# Patient Record
Sex: FEMALE | Race: WHITE
[De-identification: names, ages, dates, MRNs, and addresses within clinical notes are randomized per-mention and may not be internally consistent; named-entity substitution may affect disease eponyms.]

---

## 2018-03-17 ENCOUNTER — HOSPITAL ENCOUNTER (EMERGENCY)
Dept: HOSPITAL 82 - ED | Age: 58
Discharge: HOME | DRG: 914 | End: 2018-03-17
Payer: COMMERCIAL

## 2018-03-17 VITALS — DIASTOLIC BLOOD PRESSURE: 78 MMHG | SYSTOLIC BLOOD PRESSURE: 140 MMHG
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
## 2018-03-17 DIAGNOSIS — S09.11XA: Primary | ICD-10-CM
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
## 2020-11-30 ENCOUNTER — HOSPITAL ENCOUNTER (EMERGENCY)
Dept: HOSPITAL 82 - ED | Age: 60
Discharge: ANOTHER HOSPITAL | DRG: 295 | End: 2020-11-30
Payer: SELF-PAY

## 2020-11-30 VITALS — WEIGHT: 149.91 LBS | HEIGHT: 64 IN | BODY MASS INDEX: 25.59 KG/M2

## 2020-11-30 VITALS — SYSTOLIC BLOOD PRESSURE: 114 MMHG | DIASTOLIC BLOOD PRESSURE: 745 MMHG

## 2020-11-30 DIAGNOSIS — Z20.828: ICD-10-CM

## 2020-11-30 DIAGNOSIS — F17.200: ICD-10-CM

## 2020-11-30 DIAGNOSIS — N28.89: ICD-10-CM

## 2020-11-30 DIAGNOSIS — I82.220: Primary | ICD-10-CM

## 2020-11-30 DIAGNOSIS — D72.829: ICD-10-CM

## 2020-11-30 DIAGNOSIS — R91.8: ICD-10-CM

## 2020-11-30 LAB
ALBUMIN SERPL-MCNC: 3.6 G/DL (ref 3.2–5)
ALP SERPL-CCNC: 235 U/L (ref 38–126)
AMYLASE SERPL-CCNC: 57 U/L (ref 30–110)
ANION GAP SERPL CALCULATED.3IONS-SCNC: 13 MMOL/L
AST SERPL-CCNC: 27 U/L (ref 9–36)
BACTERIA #/AREA URNS HPF: (no result) HPF
BASOPHILS NFR BLD AUTO: 1 % (ref 0–3)
BILIRUB UR QL STRIP.AUTO: NEGATIVE
BUN SERPL-MCNC: 13 MG/DL (ref 8–23)
BUN/CREAT SERPL: 13
CHLORIDE SERPL-SCNC: 100 MMOL/L (ref 95–108)
CO2 SERPL-SCNC: 28 MMOL/L (ref 22–30)
COLOR UR AUTO: YELLOW
CREAT SERPL-MCNC: 1 MG/DL (ref 0.5–1)
EOSINOPHIL NFR BLD AUTO: 1 % (ref 0–8)
EPI CELLS URNS QL MICRO: (no result) EPI/HPF
ERYTHROCYTE [DISTWIDTH] IN BLOOD BY AUTOMATED COUNT: 14.6 % (ref 11.5–15.5)
GLUCOSE UR STRIP.AUTO-MCNC: NEGATIVE MG/DL
HCT VFR BLD AUTO: 41.2 % (ref 37–47)
HGB BLD-MCNC: 12.6 G/DL (ref 12–16)
HGB UR QL STRIP.AUTO: (no result)
IMM GRANULOCYTES NFR BLD: 0.5 % (ref 0–5)
KETONES UR STRIP.AUTO-MCNC: NEGATIVE MG/DL
LEUKOCYTE ESTERASE UR QL STRIP.AUTO: NEGATIVE
LIPASE SERPL-CCNC: 46 U/L (ref 23–300)
LYMPHOCYTES NFR BLD: 7 % (ref 15–41)
MCH RBC QN AUTO: 27.2 PG  CALC (ref 26–32)
MCHC RBC AUTO-ENTMCNC: 30.6 G/DL CAL (ref 32–36)
MCV RBC AUTO: 88.8 FL  CALC (ref 80–100)
MONOCYTES NFR BLD AUTO: 7 % (ref 2–13)
MYOGLOBIN SERPL-MCNC: 18 NG/ML (ref 0–62)
NEUTROPHILS # BLD AUTO: 18.14 THOU/UL (ref 2–7.15)
NEUTROPHILS NFR BLD AUTO: 85 % (ref 42–76)
NITRITE UR QL STRIP.AUTO: POSITIVE
PH UR STRIP.AUTO: 7 [PH] (ref 4.5–8)
PLATELET # BLD AUTO: 681 THOU/UL (ref 130–400)
POTASSIUM SERPL-SCNC: 4.8 MMOL/L (ref 3.5–5.1)
PROT SERPL-MCNC: 7.2 G/DL (ref 6.3–8.2)
PROT UR QL STRIP.AUTO: NEGATIVE MG/DL
RBC # BLD AUTO: 4.64 MILL/UL (ref 4.2–5.6)
RBC #/AREA URNS HPF: (no result) RBC/HPF (ref 0–5)
SODIUM SERPL-SCNC: 136 MMOL/L (ref 137–146)
SP GR UR STRIP.AUTO: <=1.005
UROBILINOGEN UR QL STRIP.AUTO: 0.2 E.U./DL
WBC #/AREA URNS HPF: (no result) WBC/HPF (ref 0–5)

## 2020-11-30 PROCEDURE — S0164 INJECTION PANTROPRAZOLE: HCPCS

## 2020-12-02 NOTE — NUR
Final urine culture results were sent via fax [phone] to the nurse at [facility]

## 2021-01-30 ENCOUNTER — HOSPITAL ENCOUNTER (INPATIENT)
Dept: HOSPITAL 82 - ED | Age: 61
LOS: 3 days | Discharge: HOME HEALTH | DRG: 91 | End: 2021-02-02
Attending: INTERNAL MEDICINE | Admitting: INTERNAL MEDICINE
Payer: MEDICAID

## 2021-01-30 VITALS — HEIGHT: 62 IN | BODY MASS INDEX: 23.12 KG/M2 | WEIGHT: 125.66 LBS

## 2021-01-30 DIAGNOSIS — G92: Primary | ICD-10-CM

## 2021-01-30 DIAGNOSIS — T40.7X5A: ICD-10-CM

## 2021-01-30 DIAGNOSIS — G93.41: ICD-10-CM

## 2021-01-30 DIAGNOSIS — Z86.718: ICD-10-CM

## 2021-01-30 DIAGNOSIS — I95.9: ICD-10-CM

## 2021-01-30 DIAGNOSIS — E86.0: ICD-10-CM

## 2021-01-30 DIAGNOSIS — C34.01: ICD-10-CM

## 2021-01-30 DIAGNOSIS — Z87.891: ICD-10-CM

## 2021-01-30 DIAGNOSIS — C79.70: ICD-10-CM

## 2021-01-30 DIAGNOSIS — E87.5: ICD-10-CM

## 2021-01-30 DIAGNOSIS — E87.1: ICD-10-CM

## 2021-01-30 DIAGNOSIS — Z79.01: ICD-10-CM

## 2021-01-30 DIAGNOSIS — D70.1: ICD-10-CM

## 2021-01-30 DIAGNOSIS — D64.81: ICD-10-CM

## 2021-01-30 DIAGNOSIS — Z79.899: ICD-10-CM

## 2021-01-30 DIAGNOSIS — T45.1X5A: ICD-10-CM

## 2021-01-30 DIAGNOSIS — T40.2X5A: ICD-10-CM

## 2021-01-30 LAB
ALBUMIN SERPL-MCNC: 3 G/DL (ref 3.2–5)
ALP SERPL-CCNC: 177 U/L (ref 38–126)
ANION GAP SERPL CALCULATED.3IONS-SCNC: 12 MMOL/L
AST SERPL-CCNC: 56 U/L (ref 9–36)
BASOPHILS NFR BLD AUTO: 0 % (ref 0–3)
BUN SERPL-MCNC: 28 MG/DL (ref 8–23)
BUN/CREAT SERPL: 30
CHLORIDE SERPL-SCNC: 100 MMOL/L (ref 95–108)
CO2 SERPL-SCNC: 19 MMOL/L (ref 22–30)
CREAT SERPL-MCNC: 0.9 MG/DL (ref 0.5–1)
EOSINOPHIL NFR BLD AUTO: 0 % (ref 0–8)
ERYTHROCYTE [DISTWIDTH] IN BLOOD BY AUTOMATED COUNT: 16.9 % (ref 11.5–15.5)
ETHANOL SERPL-MCNC: 0 MG/DL (ref 0–30)
HCT VFR BLD AUTO: 31.9 % (ref 37–47)
HGB BLD-MCNC: 9.3 G/DL (ref 12–16)
IMM GRANULOCYTES NFR BLD: 1.1 % (ref 0–5)
INR PPP: 1.2 RATIO (ref 0.7–1.3)
LYMPHOCYTES NFR BLD: 7 % (ref 15–41)
MCH RBC QN AUTO: 27.6 PG  CALC (ref 26–32)
MCHC RBC AUTO-ENTMCNC: 29.2 G/DL CAL (ref 32–36)
MCV RBC AUTO: 94.7 FL  CALC (ref 80–100)
MONOCYTES NFR BLD AUTO: 0 % (ref 2–13)
MYOGLOBIN SERPL-MCNC: 34 NG/ML (ref 0–62)
NEUTROPHILS # BLD AUTO: 12.67 THOU/UL (ref 2–7.15)
NEUTROPHILS NFR BLD AUTO: 91 % (ref 42–76)
PLATELET # BLD AUTO: 469 THOU/UL (ref 130–400)
POTASSIUM SERPL-SCNC: 5.3 MMOL/L (ref 3.5–5.1)
PROT SERPL-MCNC: 5.9 G/DL (ref 6.3–8.2)
PROTHROMBIN TIME: 11.8 SECONDS (ref 9–12.5)
RBC # BLD AUTO: 3.37 MILL/UL (ref 4.2–5.6)
SODIUM SERPL-SCNC: 126 MMOL/L (ref 137–146)

## 2021-01-30 PROCEDURE — P9016 RBC LEUKOCYTES REDUCED: HCPCS

## 2021-01-31 VITALS — SYSTOLIC BLOOD PRESSURE: 95 MMHG | DIASTOLIC BLOOD PRESSURE: 56 MMHG

## 2021-01-31 VITALS — SYSTOLIC BLOOD PRESSURE: 116 MMHG | DIASTOLIC BLOOD PRESSURE: 63 MMHG

## 2021-01-31 VITALS — SYSTOLIC BLOOD PRESSURE: 114 MMHG | DIASTOLIC BLOOD PRESSURE: 60 MMHG

## 2021-01-31 VITALS — DIASTOLIC BLOOD PRESSURE: 51 MMHG | SYSTOLIC BLOOD PRESSURE: 89 MMHG

## 2021-01-31 VITALS — SYSTOLIC BLOOD PRESSURE: 104 MMHG | DIASTOLIC BLOOD PRESSURE: 60 MMHG

## 2021-01-31 VITALS — SYSTOLIC BLOOD PRESSURE: 125 MMHG | DIASTOLIC BLOOD PRESSURE: 69 MMHG

## 2021-01-31 VITALS — DIASTOLIC BLOOD PRESSURE: 87 MMHG | SYSTOLIC BLOOD PRESSURE: 126 MMHG

## 2021-01-31 VITALS — SYSTOLIC BLOOD PRESSURE: 116 MMHG | DIASTOLIC BLOOD PRESSURE: 61 MMHG

## 2021-01-31 VITALS — DIASTOLIC BLOOD PRESSURE: 61 MMHG | SYSTOLIC BLOOD PRESSURE: 115 MMHG

## 2021-01-31 VITALS — DIASTOLIC BLOOD PRESSURE: 65 MMHG | SYSTOLIC BLOOD PRESSURE: 117 MMHG

## 2021-01-31 VITALS — SYSTOLIC BLOOD PRESSURE: 118 MMHG | DIASTOLIC BLOOD PRESSURE: 54 MMHG

## 2021-01-31 VITALS — SYSTOLIC BLOOD PRESSURE: 103 MMHG | DIASTOLIC BLOOD PRESSURE: 56 MMHG

## 2021-01-31 VITALS — SYSTOLIC BLOOD PRESSURE: 106 MMHG | DIASTOLIC BLOOD PRESSURE: 72 MMHG

## 2021-01-31 VITALS — SYSTOLIC BLOOD PRESSURE: 119 MMHG | DIASTOLIC BLOOD PRESSURE: 71 MMHG

## 2021-01-31 VITALS — DIASTOLIC BLOOD PRESSURE: 59 MMHG | SYSTOLIC BLOOD PRESSURE: 122 MMHG

## 2021-01-31 VITALS — DIASTOLIC BLOOD PRESSURE: 62 MMHG | SYSTOLIC BLOOD PRESSURE: 127 MMHG

## 2021-01-31 VITALS — SYSTOLIC BLOOD PRESSURE: 134 MMHG | DIASTOLIC BLOOD PRESSURE: 67 MMHG

## 2021-01-31 VITALS — SYSTOLIC BLOOD PRESSURE: 102 MMHG | DIASTOLIC BLOOD PRESSURE: 52 MMHG

## 2021-01-31 VITALS — SYSTOLIC BLOOD PRESSURE: 95 MMHG | DIASTOLIC BLOOD PRESSURE: 57 MMHG

## 2021-01-31 VITALS — DIASTOLIC BLOOD PRESSURE: 59 MMHG | SYSTOLIC BLOOD PRESSURE: 103 MMHG

## 2021-01-31 VITALS — SYSTOLIC BLOOD PRESSURE: 84 MMHG | DIASTOLIC BLOOD PRESSURE: 48 MMHG

## 2021-01-31 VITALS — DIASTOLIC BLOOD PRESSURE: 71 MMHG | SYSTOLIC BLOOD PRESSURE: 119 MMHG

## 2021-01-31 VITALS — SYSTOLIC BLOOD PRESSURE: 117 MMHG | DIASTOLIC BLOOD PRESSURE: 59 MMHG

## 2021-01-31 VITALS — SYSTOLIC BLOOD PRESSURE: 91 MMHG | DIASTOLIC BLOOD PRESSURE: 60 MMHG

## 2021-01-31 VITALS — DIASTOLIC BLOOD PRESSURE: 57 MMHG | SYSTOLIC BLOOD PRESSURE: 117 MMHG

## 2021-01-31 VITALS — SYSTOLIC BLOOD PRESSURE: 101 MMHG | DIASTOLIC BLOOD PRESSURE: 57 MMHG

## 2021-01-31 VITALS — SYSTOLIC BLOOD PRESSURE: 104 MMHG | DIASTOLIC BLOOD PRESSURE: 58 MMHG

## 2021-01-31 VITALS — DIASTOLIC BLOOD PRESSURE: 76 MMHG | SYSTOLIC BLOOD PRESSURE: 116 MMHG

## 2021-01-31 VITALS — DIASTOLIC BLOOD PRESSURE: 61 MMHG | SYSTOLIC BLOOD PRESSURE: 131 MMHG

## 2021-01-31 VITALS — DIASTOLIC BLOOD PRESSURE: 58 MMHG | SYSTOLIC BLOOD PRESSURE: 99 MMHG

## 2021-01-31 VITALS — SYSTOLIC BLOOD PRESSURE: 130 MMHG | DIASTOLIC BLOOD PRESSURE: 59 MMHG

## 2021-01-31 VITALS — SYSTOLIC BLOOD PRESSURE: 101 MMHG | DIASTOLIC BLOOD PRESSURE: 53 MMHG

## 2021-01-31 LAB
ANION GAP SERPL CALCULATED.3IONS-SCNC: 10 MMOL/L
BASOPHILS NFR BLD AUTO: 0 % (ref 0–3)
BILIRUB UR QL STRIP.AUTO: NEGATIVE
BUN SERPL-MCNC: 20 MG/DL (ref 8–23)
BUN/CREAT SERPL: 27
CHLORIDE SERPL-SCNC: 108 MMOL/L (ref 95–108)
CO2 SERPL-SCNC: 20 MMOL/L (ref 22–30)
COLOR UR AUTO: YELLOW
CREAT SERPL-MCNC: 0.7 MG/DL (ref 0.5–1)
EOSINOPHIL NFR BLD AUTO: 0 % (ref 0–8)
ERYTHROCYTE [DISTWIDTH] IN BLOOD BY AUTOMATED COUNT: 16.5 % (ref 11.5–15.5)
GLUCOSE UR STRIP.AUTO-MCNC: NEGATIVE MG/DL
HCT VFR BLD AUTO: 27.1 % (ref 37–47)
HGB BLD-MCNC: 8.1 G/DL (ref 12–16)
HGB UR QL STRIP.AUTO: NEGATIVE
IMM GRANULOCYTES NFR BLD: 3.7 % (ref 0–5)
KETONES UR STRIP.AUTO-MCNC: NEGATIVE MG/DL
LEUKOCYTE ESTERASE UR QL STRIP.AUTO: NEGATIVE
LYMPHOCYTES NFR BLD: 5 % (ref 15–41)
MCH RBC QN AUTO: 27.4 PG  CALC (ref 26–32)
MCHC RBC AUTO-ENTMCNC: 29.9 G/DL CAL (ref 32–36)
MCV RBC AUTO: 91.6 FL  CALC (ref 80–100)
MONOCYTES NFR BLD AUTO: 0 % (ref 2–13)
NEUTROPHILS # BLD AUTO: 10.94 THOU/UL (ref 2–7.15)
NEUTROPHILS NFR BLD AUTO: 91 % (ref 42–76)
NITRITE UR QL STRIP.AUTO: NEGATIVE
PH UR STRIP.AUTO: 6 [PH] (ref 4.5–8)
PLATELET # BLD AUTO: 557 THOU/UL (ref 130–400)
POTASSIUM SERPL-SCNC: 4.3 MMOL/L (ref 3.5–5.1)
PROT UR QL STRIP.AUTO: NEGATIVE MG/DL
RBC # BLD AUTO: 2.96 MILL/UL (ref 4.2–5.6)
SODIUM SERPL-SCNC: 134 MMOL/L (ref 137–146)
SP GR UR STRIP.AUTO: 1.01
UROBILINOGEN UR QL STRIP.AUTO: 0.2 E.U./DL

## 2021-02-01 VITALS — SYSTOLIC BLOOD PRESSURE: 110 MMHG | DIASTOLIC BLOOD PRESSURE: 72 MMHG

## 2021-02-01 VITALS — DIASTOLIC BLOOD PRESSURE: 83 MMHG | SYSTOLIC BLOOD PRESSURE: 151 MMHG

## 2021-02-01 VITALS — DIASTOLIC BLOOD PRESSURE: 51 MMHG | SYSTOLIC BLOOD PRESSURE: 95 MMHG

## 2021-02-01 VITALS — DIASTOLIC BLOOD PRESSURE: 65 MMHG | SYSTOLIC BLOOD PRESSURE: 137 MMHG

## 2021-02-01 VITALS — DIASTOLIC BLOOD PRESSURE: 52 MMHG | SYSTOLIC BLOOD PRESSURE: 90 MMHG

## 2021-02-01 VITALS — DIASTOLIC BLOOD PRESSURE: 58 MMHG | SYSTOLIC BLOOD PRESSURE: 99 MMHG

## 2021-02-01 VITALS — SYSTOLIC BLOOD PRESSURE: 101 MMHG | DIASTOLIC BLOOD PRESSURE: 57 MMHG

## 2021-02-01 VITALS — SYSTOLIC BLOOD PRESSURE: 113 MMHG | DIASTOLIC BLOOD PRESSURE: 68 MMHG

## 2021-02-01 VITALS — DIASTOLIC BLOOD PRESSURE: 57 MMHG | SYSTOLIC BLOOD PRESSURE: 86 MMHG

## 2021-02-01 VITALS — DIASTOLIC BLOOD PRESSURE: 56 MMHG | SYSTOLIC BLOOD PRESSURE: 113 MMHG

## 2021-02-01 VITALS — DIASTOLIC BLOOD PRESSURE: 62 MMHG | SYSTOLIC BLOOD PRESSURE: 120 MMHG

## 2021-02-01 VITALS — DIASTOLIC BLOOD PRESSURE: 60 MMHG | SYSTOLIC BLOOD PRESSURE: 133 MMHG

## 2021-02-01 VITALS — DIASTOLIC BLOOD PRESSURE: 54 MMHG | SYSTOLIC BLOOD PRESSURE: 85 MMHG

## 2021-02-01 VITALS — DIASTOLIC BLOOD PRESSURE: 68 MMHG | SYSTOLIC BLOOD PRESSURE: 113 MMHG

## 2021-02-01 VITALS — DIASTOLIC BLOOD PRESSURE: 59 MMHG | SYSTOLIC BLOOD PRESSURE: 102 MMHG

## 2021-02-01 VITALS — DIASTOLIC BLOOD PRESSURE: 51 MMHG | SYSTOLIC BLOOD PRESSURE: 97 MMHG

## 2021-02-01 VITALS — SYSTOLIC BLOOD PRESSURE: 117 MMHG | DIASTOLIC BLOOD PRESSURE: 55 MMHG

## 2021-02-01 VITALS — DIASTOLIC BLOOD PRESSURE: 73 MMHG | SYSTOLIC BLOOD PRESSURE: 123 MMHG

## 2021-02-01 LAB
ANION GAP SERPL CALCULATED.3IONS-SCNC: 7 MMOL/L
BASOPHILS NFR BLD AUTO: 1 % (ref 0–3)
BUN SERPL-MCNC: 8 MG/DL (ref 8–23)
BUN/CREAT SERPL: 16
CHLORIDE SERPL-SCNC: 109 MMOL/L (ref 95–108)
CO2 SERPL-SCNC: 20 MMOL/L (ref 22–30)
CREAT SERPL-MCNC: 0.5 MG/DL (ref 0.5–1)
EOSINOPHIL NFR BLD AUTO: 1 % (ref 0–8)
ERYTHROCYTE [DISTWIDTH] IN BLOOD BY AUTOMATED COUNT: 16.6 % (ref 11.5–15.5)
HCT VFR BLD AUTO: 21.4 % (ref 37–47)
HGB BLD-MCNC: 6.5 G/DL (ref 12–16)
IMM GRANULOCYTES NFR BLD: 2.1 % (ref 0–5)
LYMPHOCYTES NFR BLD: 11 % (ref 15–41)
MCH RBC QN AUTO: 27.4 PG  CALC (ref 26–32)
MCHC RBC AUTO-ENTMCNC: 30.4 G/DL CAL (ref 32–36)
MCV RBC AUTO: 90.3 FL  CALC (ref 80–100)
MONOCYTES NFR BLD AUTO: 2 % (ref 2–13)
NEUTROPHILS # BLD AUTO: 4.68 THOU/UL (ref 2–7.15)
NEUTROPHILS NFR BLD AUTO: 84 % (ref 42–76)
PLATELET # BLD AUTO: 484 THOU/UL (ref 130–400)
POTASSIUM SERPL-SCNC: 4.3 MMOL/L (ref 3.5–5.1)
RBC # BLD AUTO: 2.37 MILL/UL (ref 4.2–5.6)
SODIUM SERPL-SCNC: 132 MMOL/L (ref 137–146)

## 2021-02-01 PROCEDURE — 30233N1 TRANSFUSION OF NONAUTOLOGOUS RED BLOOD CELLS INTO PERIPHERAL VEIN, PERCUTANEOUS APPROACH: ICD-10-PCS | Performed by: INTERNAL MEDICINE

## 2021-02-02 VITALS — DIASTOLIC BLOOD PRESSURE: 59 MMHG | SYSTOLIC BLOOD PRESSURE: 103 MMHG

## 2021-02-02 VITALS — DIASTOLIC BLOOD PRESSURE: 75 MMHG | SYSTOLIC BLOOD PRESSURE: 123 MMHG

## 2021-02-02 VITALS — SYSTOLIC BLOOD PRESSURE: 108 MMHG | DIASTOLIC BLOOD PRESSURE: 58 MMHG

## 2021-02-02 VITALS — DIASTOLIC BLOOD PRESSURE: 77 MMHG | SYSTOLIC BLOOD PRESSURE: 119 MMHG

## 2021-02-02 VITALS — DIASTOLIC BLOOD PRESSURE: 71 MMHG | SYSTOLIC BLOOD PRESSURE: 136 MMHG

## 2021-02-02 VITALS — DIASTOLIC BLOOD PRESSURE: 60 MMHG | SYSTOLIC BLOOD PRESSURE: 119 MMHG

## 2021-02-02 LAB
ANION GAP SERPL CALCULATED.3IONS-SCNC: 7 MMOL/L
BUN SERPL-MCNC: 7 MG/DL (ref 8–23)
BUN/CREAT SERPL: 12
CHLORIDE SERPL-SCNC: 109 MMOL/L (ref 95–108)
CO2 SERPL-SCNC: 21 MMOL/L (ref 22–30)
CREAT SERPL-MCNC: 0.6 MG/DL (ref 0.5–1)
ERYTHROCYTE [DISTWIDTH] IN BLOOD BY AUTOMATED COUNT: 16 % (ref 11.5–15.5)
HCT VFR BLD AUTO: 26.5 % (ref 37–47)
HGB BLD-MCNC: 8.2 G/DL (ref 12–16)
MCH RBC QN AUTO: 28.1 PG  CALC (ref 26–32)
MCHC RBC AUTO-ENTMCNC: 30.9 G/DL CAL (ref 32–36)
MCV RBC AUTO: 90.8 FL  CALC (ref 80–100)
PLATELET # BLD AUTO: 477 THOU/UL (ref 130–400)
POTASSIUM SERPL-SCNC: 4.3 MMOL/L (ref 3.5–5.1)
RBC # BLD AUTO: 2.92 MILL/UL (ref 4.2–5.6)
SODIUM SERPL-SCNC: 132 MMOL/L (ref 137–146)

## 2021-02-12 ENCOUNTER — HOSPITAL ENCOUNTER (EMERGENCY)
Dept: HOSPITAL 82 - ED | Age: 61
Discharge: HOME | End: 2021-02-12
Payer: MEDICAID

## 2021-02-12 VITALS — BODY MASS INDEX: 24.84 KG/M2 | WEIGHT: 134.99 LBS | HEIGHT: 62 IN

## 2021-02-12 VITALS — SYSTOLIC BLOOD PRESSURE: 154 MMHG | DIASTOLIC BLOOD PRESSURE: 81 MMHG

## 2021-02-12 DIAGNOSIS — K12.1: Primary | ICD-10-CM

## 2021-02-12 DIAGNOSIS — C34.90: ICD-10-CM

## 2021-02-12 DIAGNOSIS — Z79.899: ICD-10-CM

## 2021-02-12 LAB
ALBUMIN SERPL-MCNC: 2.6 G/DL (ref 3.2–5)
ALP SERPL-CCNC: 368 U/L (ref 38–126)
ANION GAP SERPL CALCULATED.3IONS-SCNC: 11 MMOL/L
AST SERPL-CCNC: 46 U/L (ref 9–36)
BASOPHILS NFR BLD AUTO: 1 % (ref 0–3)
BUN SERPL-MCNC: 8 MG/DL (ref 8–23)
BUN/CREAT SERPL: 11
CHLORIDE SERPL-SCNC: 106 MMOL/L (ref 95–108)
CO2 SERPL-SCNC: 25 MMOL/L (ref 22–30)
CREAT SERPL-MCNC: 0.7 MG/DL (ref 0.5–1)
EOSINOPHIL NFR BLD AUTO: 2 % (ref 0–8)
ERYTHROCYTE [DISTWIDTH] IN BLOOD BY AUTOMATED COUNT: 17.2 % (ref 11.5–15.5)
HCT VFR BLD AUTO: 34.4 % (ref 37–47)
HGB BLD-MCNC: 10.2 G/DL (ref 12–16)
IMM GRANULOCYTES NFR BLD: 0.7 % (ref 0–5)
LYMPHOCYTES NFR BLD: 28 % (ref 15–41)
MCH RBC QN AUTO: 27.3 PG  CALC (ref 26–32)
MCHC RBC AUTO-ENTMCNC: 29.7 G/DL CAL (ref 32–36)
MCV RBC AUTO: 92 FL  CALC (ref 80–100)
MONOCYTES NFR BLD AUTO: 14 % (ref 2–13)
NEUTROPHILS # BLD AUTO: 3.75 THOU/UL (ref 2–7.15)
NEUTROPHILS NFR BLD AUTO: 55 % (ref 42–76)
PLATELET # BLD AUTO: 588 THOU/UL (ref 130–400)
POTASSIUM SERPL-SCNC: 4.3 MMOL/L (ref 3.5–5.1)
PROT SERPL-MCNC: 5.5 G/DL (ref 6.3–8.2)
RBC # BLD AUTO: 3.74 MILL/UL (ref 4.2–5.6)
SODIUM SERPL-SCNC: 137 MMOL/L (ref 137–146)

## 2021-02-19 ENCOUNTER — HOSPITAL ENCOUNTER (EMERGENCY)
Dept: HOSPITAL 82 - ED | Age: 61
Discharge: HOME | End: 2021-02-19
Payer: MEDICAID

## 2021-02-19 VITALS — HEIGHT: 62 IN | WEIGHT: 154.32 LBS | BODY MASS INDEX: 28.4 KG/M2

## 2021-02-19 VITALS — SYSTOLIC BLOOD PRESSURE: 116 MMHG | DIASTOLIC BLOOD PRESSURE: 70 MMHG

## 2021-02-19 DIAGNOSIS — B96.1: ICD-10-CM

## 2021-02-19 DIAGNOSIS — G89.3: ICD-10-CM

## 2021-02-19 DIAGNOSIS — D72.829: Primary | ICD-10-CM

## 2021-02-19 DIAGNOSIS — C79.9: ICD-10-CM

## 2021-02-19 DIAGNOSIS — N39.0: ICD-10-CM

## 2021-02-19 DIAGNOSIS — C34.90: ICD-10-CM

## 2021-02-19 LAB
ALBUMIN SERPL-MCNC: 3.3 G/DL (ref 3.2–5)
ALP SERPL-CCNC: 269 U/L (ref 38–126)
ANION GAP SERPL CALCULATED.3IONS-SCNC: 12 MMOL/L
AST SERPL-CCNC: 93 U/L (ref 9–36)
BACTERIA #/AREA URNS HPF: (no result) HPF
BASOPHILS NFR BLD MANUAL: 0 % (ref 0–3)
BILIRUB UR QL STRIP.AUTO: NEGATIVE
BUN SERPL-MCNC: 13 MG/DL (ref 8–23)
BUN/CREAT SERPL: 22
CHLORIDE SERPL-SCNC: 108 MMOL/L (ref 95–108)
CO2 SERPL-SCNC: 23 MMOL/L (ref 22–30)
COLOR UR AUTO: YELLOW
CREAT SERPL-MCNC: 0.6 MG/DL (ref 0.5–1)
EOSINOPHIL NFR BLD MANUAL: 1 % (ref 0–8)
ERYTHROCYTE [DISTWIDTH] IN BLOOD BY AUTOMATED COUNT: 18 % (ref 11.5–15.5)
GLUCOSE UR STRIP.AUTO-MCNC: NEGATIVE MG/DL
HCT VFR BLD AUTO: 32.9 % (ref 37–47)
HGB BLD-MCNC: 9.7 G/DL (ref 12–16)
HGB UR QL STRIP.AUTO: NEGATIVE
IMM GRANULOCYTES NFR BLD: 18.6 % (ref 0–5)
KETONES UR STRIP.AUTO-MCNC: NEGATIVE MG/DL
LEUKOCYTE ESTERASE UR QL STRIP.AUTO: (no result)
LYMPHOCYTES NFR BLD MANUAL: 4 % (ref 15–41)
MCH RBC QN AUTO: 27.8 PG  CALC (ref 26–32)
MCHC RBC AUTO-ENTMCNC: 29.5 G/DL CAL (ref 32–36)
MCV RBC AUTO: 94.3 FL  CALC (ref 80–100)
METAMYELOCYTES NFR BLD MANUAL: 7 % (ref 0–1)
MONOCYTES NFR BLD MANUAL: 1 % (ref 2–13)
NEUTS BAND NFR BLD MANUAL: 0 % (ref 0–8)
NEUTS SEG NFR BLD MANUAL: 87 % (ref 42–76)
NITRITE UR QL STRIP.AUTO: POSITIVE
PH UR STRIP.AUTO: 6 [PH] (ref 4.5–8)
PLATELET # BLD AUTO: 561 THOU/UL (ref 130–400)
POTASSIUM SERPL-SCNC: 4.8 MMOL/L (ref 3.5–5.1)
PROT SERPL-MCNC: 6.2 G/DL (ref 6.3–8.2)
PROT UR QL STRIP.AUTO: NEGATIVE MG/DL
RBC # BLD AUTO: 3.49 MILL/UL (ref 4.2–5.6)
RBC #/AREA URNS HPF: (no result) RBC/HPF (ref 0–5)
SODIUM SERPL-SCNC: 138 MMOL/L (ref 137–146)
SP GR UR STRIP.AUTO: 1.01
UROBILINOGEN UR QL STRIP.AUTO: 0.2 E.U./DL
WBC #/AREA URNS HPF: (no result) WBC/HPF (ref 0–5)

## 2021-02-20 LAB — MANUAL DIFF BLD: YES

## 2021-04-04 ENCOUNTER — HOSPITAL ENCOUNTER (OUTPATIENT)
Dept: HOSPITAL 82 - ED | Age: 61
Setting detail: OBSERVATION
LOS: 2 days | Discharge: HOME | End: 2021-04-06
Attending: INTERNAL MEDICINE | Admitting: INTERNAL MEDICINE
Payer: MEDICAID

## 2021-04-04 VITALS — HEIGHT: 62 IN | WEIGHT: 123.46 LBS | BODY MASS INDEX: 22.72 KG/M2

## 2021-04-04 DIAGNOSIS — R11.2: Primary | ICD-10-CM

## 2021-04-04 DIAGNOSIS — Z87.891: ICD-10-CM

## 2021-04-04 DIAGNOSIS — Z79.899: ICD-10-CM

## 2021-04-04 DIAGNOSIS — C79.9: ICD-10-CM

## 2021-04-04 DIAGNOSIS — Z92.21: ICD-10-CM

## 2021-04-04 DIAGNOSIS — C34.90: ICD-10-CM

## 2021-04-04 DIAGNOSIS — I10: ICD-10-CM

## 2021-04-04 DIAGNOSIS — Z79.01: ICD-10-CM

## 2021-04-04 DIAGNOSIS — Z20.822: ICD-10-CM

## 2021-04-04 PROCEDURE — G0378 HOSPITAL OBSERVATION PER HR: HCPCS

## 2021-04-04 NOTE — NUR
PT CONTINUES TO C/O NAUSEA, SISTER ANXIOUS STATING SHE IS JUST WORRIED AND GOES
ON ABOUT SHE CAN USUALLY CONTROL IT ETC...MULTIPLE ATTEMPTS TO EDUCATE
REGARDING CANCER AND N/V DISEASE PROCESS.

## 2021-04-04 NOTE — NUR
PT TOOK PO MEDICATION FOR NAUSEA THEN DRINK APPROX 80 ML OF LEMON LIME SODA AND
WITHIN 2-3 MIN PROMPTLY VOMITED.

## 2021-04-04 NOTE — NUR
PT STATES FEELING A LITTLE BETTER SINCE HER NAP, PT EDUCATED REGARDING THE
IMPORTANCE OF PO INTAKE WITH MEDICATIONS ESPECIALLY OPIATE PAIN MEDS AND
CHEMOTHERAPEUTIC AGENTS, ETC..PT VERBALIZES UNDERSTANDING.

## 2021-04-05 VITALS — DIASTOLIC BLOOD PRESSURE: 82 MMHG | SYSTOLIC BLOOD PRESSURE: 122 MMHG

## 2021-04-05 VITALS — DIASTOLIC BLOOD PRESSURE: 76 MMHG | SYSTOLIC BLOOD PRESSURE: 113 MMHG

## 2021-04-05 VITALS — SYSTOLIC BLOOD PRESSURE: 127 MMHG | DIASTOLIC BLOOD PRESSURE: 81 MMHG

## 2021-04-05 VITALS — SYSTOLIC BLOOD PRESSURE: 143 MMHG | DIASTOLIC BLOOD PRESSURE: 96 MMHG

## 2021-04-05 LAB
ALBUMIN SERPL-MCNC: 3.2 G/DL (ref 3.2–5)
ALP SERPL-CCNC: 146 U/L (ref 38–126)
ANION GAP SERPL CALCULATED.3IONS-SCNC: 10 MMOL/L
AST SERPL-CCNC: 61 U/L (ref 9–36)
BASOPHILS NFR BLD AUTO: 1 % (ref 0–3)
BILIRUB UR QL STRIP.AUTO: NEGATIVE
BUN SERPL-MCNC: 5 MG/DL (ref 8–23)
BUN/CREAT SERPL: 8
CHLORIDE SERPL-SCNC: 108 MMOL/L (ref 95–108)
CO2 SERPL-SCNC: 23 MMOL/L (ref 22–30)
COLOR UR AUTO: YELLOW
CREAT SERPL-MCNC: 0.6 MG/DL (ref 0.5–1)
EOSINOPHIL NFR BLD AUTO: 1 % (ref 0–8)
ERYTHROCYTE [DISTWIDTH] IN BLOOD BY AUTOMATED COUNT: 18.6 % (ref 11.5–15.5)
GLUCOSE UR STRIP.AUTO-MCNC: NEGATIVE MG/DL
HCT VFR BLD AUTO: 33.1 % (ref 37–47)
HGB BLD-MCNC: 10.1 G/DL (ref 12–16)
HGB UR QL STRIP.AUTO: (no result)
IMM GRANULOCYTES NFR BLD: 0.3 % (ref 0–5)
KETONES UR STRIP.AUTO-MCNC: (no result) MG/DL
LEUKOCYTE ESTERASE UR QL STRIP.AUTO: (no result)
LIPASE SERPL-CCNC: 82 U/L (ref 23–300)
LYMPHOCYTES NFR BLD: 18 % (ref 15–41)
MCH RBC QN AUTO: 30 PG  CALC (ref 26–32)
MCHC RBC AUTO-ENTMCNC: 30.5 G/DL CAL (ref 32–36)
MCV RBC AUTO: 98.2 FL  CALC (ref 80–100)
MONOCYTES NFR BLD AUTO: 12 % (ref 2–13)
NEUTROPHILS # BLD AUTO: 6.64 THOU/UL (ref 2–7.15)
NEUTROPHILS NFR BLD AUTO: 67 % (ref 42–76)
NITRITE UR QL STRIP.AUTO: NEGATIVE
PH UR STRIP.AUTO: 6.5 [PH] (ref 4.5–8)
PLATELET # BLD AUTO: 553 THOU/UL (ref 130–400)
POTASSIUM SERPL-SCNC: 3.7 MMOL/L (ref 3.5–5.1)
PROT SERPL-MCNC: 5.8 G/DL (ref 6.3–8.2)
PROT UR QL STRIP.AUTO: NEGATIVE MG/DL
RBC # BLD AUTO: 3.37 MILL/UL (ref 4.2–5.6)
RBC #/AREA URNS HPF: (no result) RBC/HPF (ref 0–5)
SODIUM SERPL-SCNC: 137 MMOL/L (ref 137–146)
SP GR UR STRIP.AUTO: 1.01
SQUAMOUS URNS QL MICRO: (no result) EPI/HPF
UROBILINOGEN UR QL STRIP.AUTO: 0.2 E.U./DL
WBC #/AREA URNS HPF: (no result) WBC/HPF (ref 0–5)

## 2021-04-05 NOTE — NUR
PATIENT UP TO BATHROOM TO TAKE A PARTIAL BATH AT THIS TIME. PATIENT DENEIS ALL
OTHER NEEDS AND DENIES PAIN OR NAUSEA AT THIS TIME.

## 2021-04-05 NOTE — NUR
PATIENT RESTING IN BED AT THIS TIME. PATIENT DENIES PAIN BUT STATES SHE FEELS
A LITTLE NAUSEATED. SIDERAILS ARE UP X 2 CALL LIGHT IN WITHIN REACH. PROVIDER
MADE AWARE OF PATIENTS NAUSEA AT THIS TIME.

## 2021-04-05 NOTE — NUR
PT ARRIVE ON THE FLOOR VIA STRETCHER. AMBULATE X 1 ASSIST. PORT R SUBCLAVIN.
NO COMPLAINT OF PAIN OR NAUSEA AT THIS MOMENT. PT IN BED AT THIS TIME.
ASSESMENT COMPLETE. CALL LIGHT AND SIDE TABLE WITHIN REACH. WILL CONTINUE TO
MONITOR.

## 2021-04-05 NOTE — NUR
PT STATES SHE IS FEELING MUCH BETTER, COVID SWAB COMPLETED, PT TOLERATED WELL,
AWAITING RESULTS FOR PLANNED ADMISSION

## 2021-04-05 NOTE — NUR
PATIENT RESTING IN BED BUT HAVING DIFFICULTY SLEEPING-STATES THAT SHE THINKS
IT MIGHT BE BECAUSE OF THE STEROIDS. ASKING IF SHE COULD HAVE PERCOCET FOR
PAIN THAT MIGHT HELP. CALL PLACED TO [facility] AND ORDERS
RECIEVED-PATIENT MEDICATED WITH SONATA 5MG FOR SLEEP AS ORDERED. PATIENT ABLE
TO TAKE IN SOME JOHN [employer]ACKERS AND [place] ICE WITHOU C/O OF NAUSEA. SAFETY
PRECAUTIONS REINFORCED. CALL LIGHT IN REACH. WILL CONT TO MONITOR.

## 2021-04-05 NOTE — NUR
PT COMPLAINING OF NAUSEA PRN ZOPHRAN GIVEN VIA IV. FLUIDS CONTINUE RUNNING.
WILL CONTINUE TO MONITOR.

## 2021-04-05 NOTE — NUR
PORT ACCESSED USING STERILE TECHNIQUE EARLIER WITH EXCELLENT AND IMMEDIATE
ASPIRATE NOTED, LABS DRAWN AND IVF STARTED AS ORDERED , PT AND SISTER AWARE OF
PLANNED ADMISSION.

## 2021-04-05 NOTE — NUR
PATIENT SITTING  UP AT BEDSIDE. PATIENT STATES SHE IS A "LITTLE" NAUSEATED BUT
IS MUCH BETTER THAN EARLIER. PATIENT STATES SHE ALWAYS HAS PAIN AND CURRENTLY
SHE IS A 2 OUT OF 0-10. RN ASSESSMENT DONE AT THIS TIME LUNG SOUNDS ARE
DIMINISHED THROUGH OUT. SIDERAILS ARE UP X 2 CALL LIGHT AND PERSONAL ITEMS ARE
WIHTIN REACH.

## 2021-04-05 NOTE — NUR
PATIENT SITTING ON THE SIDE OF THE BED-AWAKE ALERT AND ORIENTEDX3. IVF NS
PATENT AND INFUSING VIA RIGHT UPPER CHEST PORT AT 125CC/HR. SITE IS HEALTHY AT
THIS TIME. PATIENT WITH NO COMPLAINTS OF NAUSEA AT THIS TIME. UP TO THE BR TO
VOID WITHOUT ANY DIFFICULTY. STEADY GAIT TO THE BR. LUNGS ARE CLEAR. PATIENT
STATES THAT SHE HASN'T HAD A BM SINCE LAST WEEK-PATIENT OFFERED PRUNE JUICE
AND OR MOM BUT DECLINES AT THIS TIME. STATES THAT THIS IS NOT UNUSUAL FOR HER.
ABD X-RAY WAS DONE AS ORDERED. SAFETY PRECAUTIONS REINFORCED. CALL LIGHT IN
REACH. WILL CONT TO MONITOR.

## 2021-04-06 VITALS — DIASTOLIC BLOOD PRESSURE: 78 MMHG | SYSTOLIC BLOOD PRESSURE: 146 MMHG

## 2021-04-06 VITALS — DIASTOLIC BLOOD PRESSURE: 69 MMHG | SYSTOLIC BLOOD PRESSURE: 114 MMHG

## 2021-04-06 LAB
ANION GAP SERPL CALCULATED.3IONS-SCNC: 12 MMOL/L
BUN SERPL-MCNC: 3 MG/DL (ref 8–23)
BUN/CREAT SERPL: 7
CHLORIDE SERPL-SCNC: 109 MMOL/L (ref 95–108)
CO2 SERPL-SCNC: 24 MMOL/L (ref 22–30)
CREAT SERPL-MCNC: 0.5 MG/DL (ref 0.5–1)
ERYTHROCYTE [DISTWIDTH] IN BLOOD BY AUTOMATED COUNT: 18.3 % (ref 11.5–15.5)
HCT VFR BLD AUTO: 28 % (ref 37–47)
HGB BLD-MCNC: 8.5 G/DL (ref 12–16)
MAGNESIUM SERPL-MCNC: 1.8 MG/DL (ref 1.6–2.3)
MCH RBC QN AUTO: 30 PG  CALC (ref 26–32)
MCHC RBC AUTO-ENTMCNC: 30.4 G/DL CAL (ref 32–36)
MCV RBC AUTO: 98.9 FL  CALC (ref 80–100)
PLATELET # BLD AUTO: 526 THOU/UL (ref 130–400)
POTASSIUM SERPL-SCNC: 4.8 MMOL/L (ref 3.5–5.1)
RBC # BLD AUTO: 2.83 MILL/UL (ref 4.2–5.6)
SODIUM SERPL-SCNC: 140 MMOL/L (ref 137–146)

## 2021-04-06 NOTE — NUR
PATIENT RESTING IN BED-LAB WORK DRAWN FROM RIGHT UPPER CHEST PORT WITHOUT ANY
DIFFICULTY. GOOD BLOOD RETURN AND FLUSHED WITH NS PER PROTOCOL. IVF NS PATENT
AND INFUSING AT 125CC/HR. SITE REMAINS HEALTHY. CALL LIGHT IN REACH. WILL CONT
TO MONITOR.

## 2021-04-06 NOTE — NUR
PORT DEACCESSED AT THIS TIME UNDER ASEPTIC TECHNIQUE. AREA CLEANSED AND
BIO-PATCH PLACED AND TEGADERM APPLIED. PATIENT VERBALIZED UNDERSTANDING OF
CARE.

## 2021-04-06 NOTE — NUR
PATIENT LAYING IN BED AT THIS TIME. RN ASSESSMENT DONE SEE INTERVENTIONS.
PATIENT DENIES ANY NAUSEA OR VOMITING AT THIS TIME. PATIENT PORT IS ACCESSED
AND RETURNING BLOOD AND HAS CONTINUOUS NORMAL SALINE RUNNING AT THIS TIME.
PATIENT DENIES ANY NEEDS CALL LIGHT IS WITHIN REACH AND SIDERAILS ARE UP X2.

## 2021-04-06 NOTE — NUR
PATIENT RESTING IN BED AT THIS TIME POSITIONED ON RIGHT SIDE. RESPS ARE EVEN
AND UNLABORED. IVF PATENT AND INFUSING VIA RIGHT UPPER CHEST POPRT AT
125CC/HR. SITE REMAINS HEALTHY. CALL LIGHT IN REACH. WILL CONT TO MONITOR.

## 2021-04-07 NOTE — NUR
URINE CULTURE SHOWS ENTEROCOCCUS SPECIES. PT WAS ASYMPTOMATIC FOR UTI. RESULTS
CALLED TO [facility]. PT HAS F/U VISIT WITH DR BUTT, RESULTS FAXED TO HIS
OFFICE. I ALSO CALLED PT WHO DOESNT HAVE ANY SYMPTOMS OF UTI, BUT ADVISED HER
TO DISCUSS WITH DR BUTT IF THEY PRESENT BETWEEN NOW AND F/U VISIT 4/14

## 2021-05-07 ENCOUNTER — HOSPITAL ENCOUNTER (EMERGENCY)
Dept: HOSPITAL 82 - ED | Age: 61
Discharge: HOME | End: 2021-05-07
Payer: MEDICAID

## 2021-05-07 VITALS — SYSTOLIC BLOOD PRESSURE: 107 MMHG | DIASTOLIC BLOOD PRESSURE: 67 MMHG

## 2021-05-07 DIAGNOSIS — Z86.79: ICD-10-CM

## 2021-05-07 DIAGNOSIS — C34.90: ICD-10-CM

## 2021-05-07 DIAGNOSIS — C79.9: ICD-10-CM

## 2021-05-07 DIAGNOSIS — Z92.21: ICD-10-CM

## 2021-05-07 DIAGNOSIS — R11.0: Primary | ICD-10-CM

## 2021-05-07 LAB
ALBUMIN SERPL-MCNC: 3.6 G/DL (ref 3.2–5)
ALP SERPL-CCNC: 145 U/L (ref 38–126)
ANION GAP SERPL CALCULATED.3IONS-SCNC: 11 MMOL/L
AST SERPL-CCNC: 36 U/L (ref 9–36)
BASOPHILS NFR BLD AUTO: 1 % (ref 0–3)
BILIRUB UR QL STRIP.AUTO: NEGATIVE
BUN SERPL-MCNC: 7 MG/DL (ref 8–23)
BUN/CREAT SERPL: 7
CHLORIDE SERPL-SCNC: 104 MMOL/L (ref 95–108)
CO2 SERPL-SCNC: 25 MMOL/L (ref 22–30)
COLOR UR AUTO: YELLOW
CREAT SERPL-MCNC: 0.9 MG/DL (ref 0.5–1)
EOSINOPHIL NFR BLD AUTO: 3 % (ref 0–8)
ERYTHROCYTE [DISTWIDTH] IN BLOOD BY AUTOMATED COUNT: 15.1 % (ref 11.5–15.5)
GLUCOSE UR STRIP.AUTO-MCNC: NEGATIVE MG/DL
HCT VFR BLD AUTO: 37.3 % (ref 37–47)
HGB BLD-MCNC: 11.4 G/DL (ref 12–16)
HGB UR QL STRIP.AUTO: (no result)
IMM GRANULOCYTES NFR BLD: 0.2 % (ref 0–5)
KETONES UR STRIP.AUTO-MCNC: NEGATIVE MG/DL
LEUKOCYTE ESTERASE UR QL STRIP.AUTO: (no result)
LIPASE SERPL-CCNC: 56 U/L (ref 23–300)
LYMPHOCYTES NFR BLD: 23 % (ref 15–41)
MCH RBC QN AUTO: 29.3 PG  CALC (ref 26–32)
MCHC RBC AUTO-ENTMCNC: 30.6 G/DL CAL (ref 32–36)
MCV RBC AUTO: 95.9 FL  CALC (ref 80–100)
MONOCYTES NFR BLD AUTO: 11 % (ref 2–13)
NEUTROPHILS # BLD AUTO: 5.79 THOU/UL (ref 2–7.15)
NEUTROPHILS NFR BLD AUTO: 63 % (ref 42–76)
NITRITE UR QL STRIP.AUTO: NEGATIVE
PH UR STRIP.AUTO: 6 [PH] (ref 4.5–8)
PLATELET # BLD AUTO: 593 THOU/UL (ref 130–400)
POTASSIUM SERPL-SCNC: 4 MMOL/L (ref 3.5–5.1)
PROT SERPL-MCNC: 6.4 G/DL (ref 6.3–8.2)
PROT UR QL STRIP.AUTO: NEGATIVE MG/DL
RBC # BLD AUTO: 3.89 MILL/UL (ref 4.2–5.6)
SODIUM SERPL-SCNC: 135 MMOL/L (ref 137–146)
SP GR UR STRIP.AUTO: <=1.005
UROBILINOGEN UR QL STRIP.AUTO: 0.2 E.U./DL

## 2023-02-25 ENCOUNTER — HOSPITAL ENCOUNTER (EMERGENCY)
Dept: HOSPITAL 82 - ED | Age: 63
Discharge: HOME | End: 2023-02-25
Payer: COMMERCIAL

## 2023-02-25 VITALS — DIASTOLIC BLOOD PRESSURE: 92 MMHG | SYSTOLIC BLOOD PRESSURE: 134 MMHG

## 2023-02-25 VITALS — SYSTOLIC BLOOD PRESSURE: 121 MMHG | DIASTOLIC BLOOD PRESSURE: 91 MMHG

## 2023-02-25 VITALS — SYSTOLIC BLOOD PRESSURE: 143 MMHG | DIASTOLIC BLOOD PRESSURE: 79 MMHG

## 2023-02-25 VITALS — SYSTOLIC BLOOD PRESSURE: 118 MMHG | DIASTOLIC BLOOD PRESSURE: 76 MMHG

## 2023-02-25 VITALS — HEIGHT: 62 IN | BODY MASS INDEX: 53.92 KG/M2 | WEIGHT: 293 LBS

## 2023-02-25 VITALS — DIASTOLIC BLOOD PRESSURE: 67 MMHG | SYSTOLIC BLOOD PRESSURE: 128 MMHG

## 2023-02-25 VITALS — DIASTOLIC BLOOD PRESSURE: 70 MMHG | SYSTOLIC BLOOD PRESSURE: 108 MMHG

## 2023-02-25 VITALS — SYSTOLIC BLOOD PRESSURE: 122 MMHG | DIASTOLIC BLOOD PRESSURE: 90 MMHG

## 2023-02-25 DIAGNOSIS — E86.0: ICD-10-CM

## 2023-02-25 DIAGNOSIS — B96.1: ICD-10-CM

## 2023-02-25 DIAGNOSIS — C79.9: ICD-10-CM

## 2023-02-25 DIAGNOSIS — C34.90: ICD-10-CM

## 2023-02-25 DIAGNOSIS — N39.0: ICD-10-CM

## 2023-02-25 DIAGNOSIS — Z20.822: ICD-10-CM

## 2023-02-25 DIAGNOSIS — Z79.899: ICD-10-CM

## 2023-02-25 DIAGNOSIS — K59.00: Primary | ICD-10-CM

## 2023-02-25 LAB
ALBUMIN SERPL-MCNC: 4.3 G/DL (ref 3.2–5)
ALP SERPL-CCNC: 128 U/L (ref 38–126)
ANION GAP SERPL CALCULATED.3IONS-SCNC: 10 MMOL/L
AST SERPL-CCNC: 382 U/L (ref 9–36)
BACTERIA #/AREA URNS HPF: (no result) HPF
BASOPHILS NFR BLD AUTO: 0.6 % (ref 0–3)
BILIRUB UR QL STRIP.AUTO: NEGATIVE
BUN SERPL-MCNC: 17 MG/DL (ref 8–23)
BUN/CREAT SERPL: 12
CHLORIDE SERPL-SCNC: 102 MMOL/L (ref 95–108)
CO2 SERPL-SCNC: 27 MMOL/L (ref 22–30)
COLOR UR AUTO: YELLOW
CREAT SERPL-MCNC: 1.5 MG/DL (ref 0.5–1)
EOSINOPHIL NFR BLD AUTO: 0.1 % (ref 0–8)
ERYTHROCYTE [DISTWIDTH] IN BLOOD BY AUTOMATED COUNT: 13.1 % (ref 11.5–15.5)
GLUCOSE UR STRIP.AUTO-MCNC: NEGATIVE MG/DL
HCT VFR BLD AUTO: 34.5 % (ref 37–47)
HGB BLD-MCNC: 11.1 G/DL (ref 12–16)
HGB UR QL STRIP.AUTO: (no result)
IMM GRANULOCYTES NFR BLD: 0.2 % (ref 0–5)
KETONES UR STRIP.AUTO-MCNC: NEGATIVE MG/DL
LEUKOCYTE ESTERASE UR QL STRIP.AUTO: NEGATIVE
LYMPHOCYTES NFR BLD: 17.8 % (ref 15–41)
MAGNESIUM SERPL-MCNC: 2.9 MG/DL (ref 1.6–2.3)
MCH RBC QN AUTO: 29.2 PG  CALC (ref 26–32)
MCHC RBC AUTO-ENTMCNC: 32.2 G/DL CAL (ref 32–36)
MCV RBC AUTO: 90.8 FL  CALC (ref 80–100)
MONOCYTES NFR BLD AUTO: 1.9 % (ref 2–13)
NEUTROPHILS # BLD AUTO: 6.6 THOU/UL (ref 2–7.15)
NEUTROPHILS NFR BLD AUTO: 79.4 % (ref 42–76)
NITRITE UR QL STRIP.AUTO: POSITIVE
PH UR STRIP.AUTO: 6 [PH] (ref 4.5–8)
PLATELET # BLD AUTO: 650 THOU/UL (ref 130–400)
POTASSIUM SERPL-SCNC: 4.2 MMOL/L (ref 3.5–5.1)
PROT SERPL-MCNC: 6.8 G/DL (ref 6.3–8.2)
PROT UR QL STRIP.AUTO: (no result) MG/DL
RBC # BLD AUTO: 3.8 MILL/UL (ref 4.2–5.6)
RBC #/AREA URNS HPF: (no result) RBC/HPF (ref 0–5)
SODIUM SERPL-SCNC: 135 MMOL/L (ref 137–146)
SP GR UR STRIP.AUTO: 1.01
SQUAMOUS URNS QL MICRO: (no result) EPI/HPF
TSH SERPL DL<=0.05 MIU/L-ACNC: 30.8 UIU/ML (ref 0.47–4.68)
UROBILINOGEN UR QL STRIP.AUTO: 0.2 E.U./DL
WBC #/AREA URNS HPF: (no result) WBC/HPF (ref 0–5)

## 2023-03-27 ENCOUNTER — HOSPITAL ENCOUNTER (EMERGENCY)
Dept: HOSPITAL 82 - ED | Age: 63
Discharge: HOME | End: 2023-03-27
Payer: COMMERCIAL

## 2023-03-27 VITALS — DIASTOLIC BLOOD PRESSURE: 75 MMHG | SYSTOLIC BLOOD PRESSURE: 118 MMHG

## 2023-03-27 VITALS — BODY MASS INDEX: 27.22 KG/M2 | WEIGHT: 147.93 LBS | HEIGHT: 62 IN

## 2023-03-27 VITALS — SYSTOLIC BLOOD PRESSURE: 118 MMHG | DIASTOLIC BLOOD PRESSURE: 75 MMHG

## 2023-03-27 VITALS — SYSTOLIC BLOOD PRESSURE: 129 MMHG | DIASTOLIC BLOOD PRESSURE: 85 MMHG

## 2023-03-27 VITALS — DIASTOLIC BLOOD PRESSURE: 78 MMHG | SYSTOLIC BLOOD PRESSURE: 111 MMHG

## 2023-03-27 VITALS — SYSTOLIC BLOOD PRESSURE: 111 MMHG | DIASTOLIC BLOOD PRESSURE: 52 MMHG

## 2023-03-27 DIAGNOSIS — R11.0: ICD-10-CM

## 2023-03-27 DIAGNOSIS — Z95.828: ICD-10-CM

## 2023-03-27 DIAGNOSIS — Z20.822: ICD-10-CM

## 2023-03-27 DIAGNOSIS — C79.9: ICD-10-CM

## 2023-03-27 DIAGNOSIS — D64.81: ICD-10-CM

## 2023-03-27 DIAGNOSIS — Z79.899: ICD-10-CM

## 2023-03-27 DIAGNOSIS — C34.90: ICD-10-CM

## 2023-03-27 DIAGNOSIS — B96.20: ICD-10-CM

## 2023-03-27 DIAGNOSIS — N39.0: Primary | ICD-10-CM

## 2023-03-27 DIAGNOSIS — T45.1X5A: ICD-10-CM

## 2023-03-27 LAB
ALBUMIN SERPL-MCNC: 4.4 G/DL (ref 3.2–5)
ALP SERPL-CCNC: 224 U/L (ref 38–126)
ANION GAP SERPL CALCULATED.3IONS-SCNC: 13 MMOL/L
AST SERPL-CCNC: 34 U/L (ref 9–36)
BACTERIA #/AREA URNS HPF: (no result) HPF
BASOPHILS NFR BLD MANUAL: 0 % (ref 0–3)
BILIRUB UR QL STRIP.AUTO: NEGATIVE
BUN SERPL-MCNC: 6 MG/DL (ref 8–23)
BUN/CREAT SERPL: 4
CHLORIDE SERPL-SCNC: 104 MMOL/L (ref 95–108)
CO2 SERPL-SCNC: 25 MMOL/L (ref 22–30)
COLOR UR AUTO: YELLOW
CREAT SERPL-MCNC: 1.3 MG/DL (ref 0.5–1)
EOSINOPHIL NFR BLD MANUAL: 0 % (ref 0–8)
ERYTHROCYTE [DISTWIDTH] IN BLOOD BY AUTOMATED COUNT: 18.4 % (ref 11.5–15.5)
GLUCOSE UR STRIP.AUTO-MCNC: NEGATIVE MG/DL
HCT VFR BLD AUTO: 28.4 % (ref 37–47)
HGB BLD-MCNC: 9.1 G/DL (ref 12–16)
HGB UR QL STRIP.AUTO: (no result)
IMM GRANULOCYTES NFR BLD: 1 % (ref 0–5)
KETONES UR STRIP.AUTO-MCNC: NEGATIVE MG/DL
LEUKOCYTE ESTERASE UR QL STRIP.AUTO: (no result)
LIPASE SERPL-CCNC: 37 U/L (ref 23–300)
LYMPHOCYTES NFR BLD MANUAL: 9 % (ref 15–41)
MANUAL DIFF BLD: YES
MCH RBC QN AUTO: 31.3 PG  CALC (ref 26–32)
MCHC RBC AUTO-ENTMCNC: 32 G/DL CAL (ref 32–36)
MCV RBC AUTO: 97.6 FL  CALC (ref 80–100)
METAMYELOCYTES NFR BLD MANUAL: 2 % (ref 0–1)
MONOCYTES NFR BLD MANUAL: 6 % (ref 2–13)
MYELOCYTES NFR BLD MANUAL: 1 % (ref 0–1)
NEUTS BAND NFR BLD MANUAL: 1 % (ref 0–8)
NEUTS SEG NFR BLD MANUAL: 81 % (ref 42–76)
NITRITE UR QL STRIP.AUTO: POSITIVE
PH UR STRIP.AUTO: 6 [PH] (ref 4.5–8)
PLATELET # BLD AUTO: 156 THOU/UL (ref 130–400)
PLATELET BLD QL SMEAR: NORMAL
POTASSIUM SERPL-SCNC: 3.6 MMOL/L (ref 3.5–5.1)
PROT SERPL-MCNC: 7.1 G/DL (ref 6.3–8.2)
PROT UR QL STRIP.AUTO: NEGATIVE MG/DL
RBC # BLD AUTO: 2.91 MILL/UL (ref 4.2–5.6)
RBC #/AREA URNS HPF: (no result) RBC/HPF (ref 0–5)
SODIUM SERPL-SCNC: 138 MMOL/L (ref 137–146)
SP GR UR STRIP.AUTO: 1.01
UROBILINOGEN UR QL STRIP.AUTO: 0.2 E.U./DL
WBC #/AREA URNS HPF: (no result) WBC/HPF (ref 0–5)

## 2023-05-19 ENCOUNTER — HOSPITAL ENCOUNTER (EMERGENCY)
Dept: HOSPITAL 82 - ED | Age: 63
LOS: 1 days | Discharge: ANOTHER HOSPITAL | End: 2023-05-20
Payer: MEDICARE

## 2023-05-19 VITALS — BODY MASS INDEX: 25.96 KG/M2 | WEIGHT: 141.1 LBS | HEIGHT: 62 IN

## 2023-05-19 DIAGNOSIS — D69.6: ICD-10-CM

## 2023-05-19 DIAGNOSIS — C34.90: ICD-10-CM

## 2023-05-19 DIAGNOSIS — R82.71: ICD-10-CM

## 2023-05-19 DIAGNOSIS — Z20.822: ICD-10-CM

## 2023-05-19 DIAGNOSIS — D64.81: Primary | ICD-10-CM

## 2023-05-19 DIAGNOSIS — T45.1X5A: ICD-10-CM

## 2023-05-19 DIAGNOSIS — R11.10: ICD-10-CM

## 2023-05-19 DIAGNOSIS — C79.9: ICD-10-CM

## 2023-05-19 LAB
ALBUMIN SERPL-MCNC: 3.6 G/DL (ref 3.2–5)
ALP SERPL-CCNC: 95 U/L (ref 38–126)
AMYLASE SERPL-CCNC: 36 U/L (ref 30–110)
ANION GAP SERPL CALCULATED.3IONS-SCNC: 10 MMOL/L
AST SERPL-CCNC: 23 U/L (ref 9–36)
BASOPHILS NFR BLD AUTO: 0 % (ref 0–3)
BUN SERPL-MCNC: 31 MG/DL (ref 8–23)
BUN/CREAT SERPL: 26
CHLORIDE SERPL-SCNC: 109 MMOL/L (ref 95–108)
CO2 SERPL-SCNC: 20 MMOL/L (ref 22–30)
CREAT SERPL-MCNC: 1.2 MG/DL (ref 0.5–1)
EOSINOPHIL NFR BLD AUTO: 0.3 % (ref 0–8)
ERYTHROCYTE [DISTWIDTH] IN BLOOD BY AUTOMATED COUNT: 15.4 % (ref 11.5–15.5)
HCT VFR BLD AUTO: 22.9 % (ref 37–47)
HGB BLD-MCNC: 7.1 G/DL (ref 12–16)
IMM GRANULOCYTES NFR BLD: 0.3 % (ref 0–5)
INR PPP: 1.1 RATIO (ref 0.7–1.3)
LIPASE SERPL-CCNC: 26 U/L (ref 23–300)
LYMPHOCYTES NFR BLD: 50.1 % (ref 15–41)
MCH RBC QN AUTO: 33.6 PG  CALC (ref 26–32)
MCHC RBC AUTO-ENTMCNC: 31 G/DL CAL (ref 32–36)
MCV RBC AUTO: 108.5 FL  CALC (ref 80–100)
MONOCYTES NFR BLD AUTO: 13.5 % (ref 2–13)
NEUTROPHILS # BLD AUTO: 1.36 THOU/UL (ref 2–7.15)
NEUTROPHILS NFR BLD AUTO: 35.8 % (ref 42–76)
PLATELET # BLD AUTO: 15 THOU/UL (ref 130–400)
POTASSIUM SERPL-SCNC: 3.9 MMOL/L (ref 3.5–5.1)
PROT SERPL-MCNC: 5.9 G/DL (ref 6.3–8.2)
PROTHROMBIN TIME: 10.9 SECONDS (ref 9–12.5)
RBC # BLD AUTO: 2.11 MILL/UL (ref 4.2–5.6)
SODIUM SERPL-SCNC: 135 MMOL/L (ref 137–146)

## 2023-05-19 PROCEDURE — S0164 INJECTION PANTROPRAZOLE: HCPCS

## 2023-05-19 PROCEDURE — P9016 RBC LEUKOCYTES REDUCED: HCPCS

## 2023-05-20 VITALS — DIASTOLIC BLOOD PRESSURE: 87 MMHG | SYSTOLIC BLOOD PRESSURE: 122 MMHG

## 2023-05-20 VITALS — DIASTOLIC BLOOD PRESSURE: 88 MMHG | SYSTOLIC BLOOD PRESSURE: 128 MMHG

## 2023-05-20 LAB
BACTERIA #/AREA URNS HPF: (no result) HPF
BILIRUB UR QL STRIP.AUTO: NEGATIVE
COLOR UR AUTO: YELLOW
GLUCOSE UR STRIP.AUTO-MCNC: NEGATIVE MG/DL
HGB UR QL STRIP.AUTO: (no result)
KETONES UR STRIP.AUTO-MCNC: NEGATIVE MG/DL
LEUKOCYTE ESTERASE UR QL STRIP.AUTO: (no result)
NITRITE UR QL STRIP.AUTO: NEGATIVE
PH UR STRIP.AUTO: 6 [PH] (ref 4.5–8)
PROT UR QL STRIP.AUTO: 30 MG/DL
RBC #/AREA URNS HPF: (no result) RBC/HPF (ref 0–5)
SERVICE CMNT 15-IMP: (no result) HPF
SP GR UR STRIP.AUTO: 1.02
SQUAMOUS URNS QL MICRO: (no result) EPI/HPF
UROBILINOGEN UR QL STRIP.AUTO: 0.2 E.U./DL
WBC #/AREA URNS HPF: (no result) WBC/HPF (ref 0–5)

## 2023-05-20 PROCEDURE — 30233N1 TRANSFUSION OF NONAUTOLOGOUS RED BLOOD CELLS INTO PERIPHERAL VEIN, PERCUTANEOUS APPROACH: ICD-10-PCS | Performed by: EMERGENCY MEDICINE
